# Patient Record
Sex: MALE | Race: OTHER | NOT HISPANIC OR LATINO | URBAN - METROPOLITAN AREA
[De-identification: names, ages, dates, MRNs, and addresses within clinical notes are randomized per-mention and may not be internally consistent; named-entity substitution may affect disease eponyms.]

---

## 2021-01-22 ENCOUNTER — EMERGENCY (EMERGENCY)
Facility: HOSPITAL | Age: 51
LOS: 1 days | Discharge: HOME | End: 2021-01-22
Attending: EMERGENCY MEDICINE | Admitting: EMERGENCY MEDICINE
Payer: OTHER GOVERNMENT

## 2021-01-22 VITALS
SYSTOLIC BLOOD PRESSURE: 148 MMHG | HEART RATE: 91 BPM | TEMPERATURE: 98 F | RESPIRATION RATE: 16 BRPM | OXYGEN SATURATION: 96 % | DIASTOLIC BLOOD PRESSURE: 85 MMHG

## 2021-01-22 VITALS
SYSTOLIC BLOOD PRESSURE: 153 MMHG | DIASTOLIC BLOOD PRESSURE: 84 MMHG | OXYGEN SATURATION: 94 % | RESPIRATION RATE: 16 BRPM | TEMPERATURE: 98 F | HEART RATE: 94 BPM

## 2021-01-22 DIAGNOSIS — S01.01XA LACERATION WITHOUT FOREIGN BODY OF SCALP, INITIAL ENCOUNTER: ICD-10-CM
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
## 2021-01-22 DIAGNOSIS — S01.81XA LACERATION WITHOUT FOREIGN BODY OF OTHER PART OF HEAD, INITIAL ENCOUNTER: ICD-10-CM
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
## 2021-01-22 PROCEDURE — 12002 RPR S/N/AX/GEN/TRNK2.6-7.5CM: CPT

## 2021-01-22 PROCEDURE — 70450 CT HEAD/BRAIN W/O DYE: CPT | Mod: 26

## 2021-01-22 PROCEDURE — 99284 EMERGENCY DEPT VISIT MOD MDM: CPT | Mod: 25

## 2021-01-22 RX ORDER — IBUPROFEN 200 MG
600 TABLET ORAL ONCE
Refills: 0 | Status: COMPLETED | OUTPATIENT
Start: 2021-01-22 | End: 2021-01-22

## 2021-01-22 RX ORDER — ACETAMINOPHEN 500 MG
975 TABLET ORAL ONCE
Refills: 0 | Status: COMPLETED | OUTPATIENT
Start: 2021-01-22 | End: 2021-01-22

## 2021-01-22 RX ADMIN — Medication 600 MILLIGRAM(S): at 22:42

## 2021-01-22 RX ADMIN — Medication 975 MILLIGRAM(S): at 22:42
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
## 2021-01-22 NOTE — ED PROVIDER NOTE - MUSCULOSKELETAL, MLM
Spine appears normal and is non tender, range of motion is not limited, no muscle or joint tenderness
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
## 2021-01-22 NOTE — ED PROVIDER NOTE - NSFOLLOWUPINSTRUCTIONS_ED_ALL_ED_FT
Laceração    Mantenha seco e coberto por 24 horas  Aplique bacitracina 2 a 3 vezes por ean jerome os próximos cheema.  Remoção da sutura em 10 cheema. Você pode ir a qualquer pronto-johnnie ou voltar aqui.      PROCURE CUIDADOS MÉDICOS IMEDIATOS SE VOCÊ TIVER ALGUM DOS SEGUINTES SINTOMAS: inchaço ao redor da ferida, piora da patricia, drenagem da ferida, estrias vermelhas saindo de sua ferida, incapacidade de  o dedo da mão ou da mão perto da laceração ou descoloração da pele perto laceração.      Síndrome Pós-Concussão    A síndrome pós-concussão ocorre quando os sintomas duram mais tempo do que o normal após um traumatismo craniano.  Quais são os sinais ou sintomas?  Após um ferimento na cabeça, você pode:  Tenho dores de cabeça. Sentir-se cansado. Sentir-se tonto. Sinta-se fraco. Tenho dificuldade em jeramie. Problemas com luzes yasmine. Problemas para ouvir. Não ser capaz de lembrar schuler coisas. Não consigo focar. Tem dificuldade para dormir. Têm mudanças de humor. Tem dificuldade em aprender coisas elton. Isso pode durar de semanas a meses.    Siga estas instruções em casa:  Medicamentos: Zofran 4mg PO conforme necessário para náuseas e vômitos.    [place] todos os medicamentos apenas conforme indicado pelo seu médico.  Não tome medicamentos prescritos para a patricia.    Atividade    Limite as atividades conforme indicado pelo seu médico. Isso inclui:  [place] de casa. Trabalho relacionado ao trabalho. Pensando. Assistindo TV. Usando um computador ou telefone. Exercício de quebra-cabeças. Esportes.  Retorne lentamente à sua atividade normal, conforme indicado pelo seu médico. Pare micheline atividade se tiver sintomas. Não faça nada que possa causar ferimentos novamente.    Instruções gerais    Descansar. Tente:  Durma de 7 a 9 horas por noite. Tire cochilos ou pausas quando se sentir cansado jerome o ean. Não natacha álcool até que seu médico diga que você pode. Acompanhe seus sintomas. [place]ha todas as consultas de acompanhamento conforme indicado pelo seu médico. Isso é importante.    Contate um médico se:  Você não melhora. Você fica pior. Você tem outra lesão.  Obtenha ajuda imediatamente se:  Você está com micheline patricia de cabeça muito forte. Você se sente confuso. Você se sente com muito sono. Você desmaia (desmaia). Você vomita (vomita). Você se sente fraco em qualquer parte do corpo. Você se sente entorpecido em qualquer parte do corpo. Você começa a tremer (tem micheline convulsão). Você tem dificuldade para falar.    Resumo  A síndrome pós-concussão ocorre quando os sintomas duram mais tempo do que o normal após um traumatismo craniano. Limite todas as atividades após a lesão. Retorne gradualmente à atividade normal conforme informado pelo seu médico. Descanse, não natacha álcool e evite analgésicos prescritos após micheline concussão. Chame seu médico se seus sintomas piorarem.    Estas informações não têm milagro objetivo substituir os conselhos dados a você por seu médico. Certifique-se de discutir quaisquer dúvidas que você tenha com o seu médico.
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
## 2021-01-22 NOTE — ED PROVIDER NOTE - PATIENT PORTAL LINK FT
You can access the FollowMyHealth Patient Portal offered by [facility] by registering at the following website: [email or web address]. By joining [employer]’s FollowMyHealth portal, you will also be able to view your health information using other applications (apps) compatible with our system.
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
## 2021-01-22 NOTE — ED PROVIDER NOTE - CLINICAL SUMMARY MEDICAL DECISION MAKING FREE TEXT BOX
Patient presenting with large and quite deep scalp laceration- will check CT head to exclude underlying fx or SAH. Td UTD. Will close lac and remove sutures from [facility]. Patient presenting with large and quite deep scalp laceration- will check CT head to exclude underlying fx or SAH. Td UTD. Will close lac and remove sutures from [facility]. [employer] [place] at bedside.

## 2021-02-04 ENCOUNTER — EMERGENCY (EMERGENCY)
Facility: HOSPITAL | Age: 51
LOS: 1 days | Discharge: HOME | End: 2021-02-04
Admitting: EMERGENCY MEDICINE
Payer: SELF-PAY

## 2021-02-04 VITALS
OXYGEN SATURATION: 95 % | DIASTOLIC BLOOD PRESSURE: 77 MMHG | HEART RATE: 87 BPM | TEMPERATURE: 98 F | RESPIRATION RATE: 18 BRPM | SYSTOLIC BLOOD PRESSURE: 111 MMHG
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
## 2021-02-04 DIAGNOSIS — Z48.02 ENCOUNTER FOR REMOVAL OF SUTURES: ICD-10-CM
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
## 2021-02-04 DIAGNOSIS — S01.01XD LACERATION WITHOUT FOREIGN BODY OF SCALP, SUBSEQUENT ENCOUNTER: ICD-10-CM
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
## 2021-02-04 PROCEDURE — L9994: CPT

## 2021-02-04 NOTE — ED PROVIDER NOTE - PATIENT PORTAL LINK FT
You can access the FollowMyHealth Patient Portal offered by [facility] by registering at the following website: [email or web address]. By joining [employer]’s FollowMyHealth portal, you will also be able to view your health information using other applications (apps) compatible with our system.

## 2021-02-04 NOTE — ED PROVIDER NOTE - PHYSICAL EXAMINATION
CONSTITUTIONAL: Well-appearing; well-nourished; in no apparent distress.   	HEAD: staples to left scalp clean/dry/intact, no discharge/pus. no erythema/swelling.   	NEURO: A & O x 3; face symmetric; grossly unremarkable.   PSYCHOLOGICAL: The patient’s mood and manner are appropriate.
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
## 2021-02-04 NOTE — ED ADULT TRIAGE NOTE - CHIEF COMPLAINT QUOTE
Pt presents to ED c/o x10 staples removal to the left side of head that was placed x12 days ago. Site clean and intact. Denies any pain

## 2021-02-05 PROBLEM — Z78.9 OTHER SPECIFIED HEALTH STATUS: Chronic | Status: ACTIVE | Noted: 2021-01-22

## 2021-02-16 ENCOUNTER — EMERGENCY (EMERGENCY)
Facility: HOSPITAL | Age: 51
LOS: 1 days | Discharge: HOME | End: 2021-02-16
Attending: EMERGENCY MEDICINE | Admitting: EMERGENCY MEDICINE
Payer: SELF-PAY

## 2021-02-16 VITALS
HEIGHT: 74.41 IN | WEIGHT: 231.49 LBS | DIASTOLIC BLOOD PRESSURE: 90 MMHG | HEART RATE: 87 BPM | SYSTOLIC BLOOD PRESSURE: 140 MMHG | OXYGEN SATURATION: 96 % | TEMPERATURE: 99 F | RESPIRATION RATE: 18 BRPM

## 2021-02-16 PROCEDURE — 70450 CT HEAD/BRAIN W/O DYE: CPT | Mod: 26

## 2021-02-16 PROCEDURE — 99284 EMERGENCY DEPT VISIT MOD MDM: CPT

## 2021-02-16 RX ORDER — ONDANSETRON 8 MG/1
4 TABLET, FILM COATED ORAL ONCE
Refills: 0 | Status: COMPLETED | OUTPATIENT
Start: 2021-02-16 | End: 2021-02-16

## 2021-02-16 RX ORDER — IBUPROFEN 200 MG
800 TABLET ORAL ONCE
Refills: 0 | Status: COMPLETED | OUTPATIENT
Start: 2021-02-16 | End: 2021-02-16

## 2021-02-16 RX ORDER — ACETAMINOPHEN 500 MG
975 TABLET ORAL ONCE
Refills: 0 | Status: COMPLETED | OUTPATIENT
Start: 2021-02-16 | End: 2021-02-16

## 2021-02-16 RX ADMIN — ONDANSETRON 4 MILLIGRAM(S): 8 TABLET, FILM COATED ORAL at 15:52

## 2021-02-16 RX ADMIN — Medication 975 MILLIGRAM(S): at 15:52

## 2021-02-16 RX ADMIN — Medication 800 MILLIGRAM(S): at 15:52
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
## 2021-02-16 NOTE — ED PROVIDER NOTE - CARE PROVIDER_API CALL
Gavin Edmondson)  Neurology; Neuromuscular Medicine  [address], [address] [place] [address]  Phone: [phone]  Fax: [phone]  Follow Up Time:

## 2021-02-16 NOTE — ED PROVIDER NOTE - PATIENT PORTAL LINK FT
You can access the FollowMyHealth Patient Portal offered by [facility] by registering at the following website: [email or web address]. By joining [employer]’s FollowMyHealth portal, you will also be able to view your health information using other applications (apps) compatible with our system.

## 2021-02-16 NOTE — ED PROVIDER NOTE - PHYSICAL EXAMINATION
VITAL SIGNS: I have reviewed nursing notes and confirm.  CONSTITUTIONAL: Well-developed; well-nourished; in no acute distress.  SKIN: Skin exam is warm and dry, no acute rash.  HEAD: Normocephalic; atraumatic.  EYES: PERRL, EOM intact; conjunctiva and sclera clear.  ENT: No nasal discharge; airway clear.  NECK: Supple; non tender.  CARD: S1, S2 normal; no murmurs, gallops, or rubs. Regular rate and rhythm.  RESP: Unlabored. No wheezes, rales or rhonchi.  ABD: soft; non-distended; non-tender  MSK: Normal ROM. No cyanosis or edema. Non-ttp all ext, distal pulses intact  LYMPH: No acute cervical adenopathy.  NEURO: Patient is alert, oriented x person, place and time.  Cranial nerves 2-12 are intact.  Normal gait and speech.  Cerebellar testing normal:  negative Romberg, normal coordination and normal finger to nose, heal to shin and rapid alternating movements.  Normal sensory exam throughout.  No pronator drift.  5/5 bl upper extremity and lower extremity strength. Visual fields intact   PSYCH: Cooperative, appropriate.
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
## 2021-02-16 NOTE — ED PROVIDER NOTE - NSFOLLOWUPINSTRUCTIONS_ED_ALL_ED_FT
Concussão, adultos    Concussion, Adult       Micheline concussão é micheline lesão no cérebro decorrente de um impacto forte e direto (trauma) na cabeça ou no corpo. Esse impacto direto tanisha o cérebro chacoalhar rapidamente para frente e para trás dentro do crânio. Isso pode danificar células cerebrais e causar alterações químicas no cérebro. Concussões também podem ser conhecidas gary lesões cerebrais traumáticas (LCTs) leves.    Concussões em nisa não representam risco de britta, mas os efeitos de micheline concussão podem ser sérios. Se você sofrer micheline concussão, deve ter muito cuidado para evitar micheline segunda concussão.      Quais são as causas?  Esse quadro clínico é causado por:•Micheline pancada diretamente na cabeça, gary:  •Chocar-se contra outro jogador jerome micheline armando de esporte.      •Alexi um golpe em micheline luta.      •Bater a cabeça sobre micheline superfície dura.        •Um movimento súbito de seu corpo que tanisha com que seu cérebro se mova para frente e para trás dentro do crânio, gary em um acidente de erika.        Quais são os sinais ou sintomas?    Os sinais de micheline concussão podem ser difíceis de perceber. Logo de início, você, parentes seus e profissionais de saúde poderão não notá-los. Você pode aparentar estar bem externamente, mas pode se comportar ou se sentir diferente.    Os sintomas geralmente são temporários e, na maioria dos casos, melhoram em 7–10 cheema. Alguns sintomas aparecem imediatamente, mas outros podem surgir apenas depois de horas ou cheema. Se os sintomas durarem mais que o normal, você pode estar tendo micheline síndrome pós-concussão. Cada lesão na cabeça é diferente.    Sintomas físicos     •Dores de cabeça. Isso pode incluir micheline sensação de pressão na cabeça ou sintomas semelhantes aos de micheline enxaqueca.      •Cansaço (fadiga).      •Tontura.      •Problemas com a coordenação e equilíbrio.      •Problemas de visão ou audição.      •Sensibilidade à khai ou ao ruído.      •Enjoo ou vômito.      •Alterações dos hábitos alimentares ou de sono.      •Dormência ou formigamento.      •Convulsão.      Sintomas mentais e emocionais     •Problemas de memória.      •Problemas para se concentrar, organizar ou leah decisões.      •Lentidão ao pensar, agir ou reagir, falar ou ler.      •Irritabilidade ou alterações do humor.      •Ansiedade ou depressão.        [place] esse quadro clínico é diagnosticado?  Esse quadro clínico é diagnosticado com base em:  •Seus sintomas.      •Micheline descrição da lesão.    Você também poderá realizar exames, inclusive:  •Exames por imagem, gary um exame de TC (tomografia computadorizada) ou RM (ressonância magnética).      •Testes neuropsicológicos. Esses testes medem o pensamento, a compreensão, o aprendizado e a memória.        [place] esse quadro clínico é tratado?  O tratamento desse quadro clínico inclui:•Parar de praticar esportes ou outras atividades se você estiver machucado. Se você bater a cabeça ou mostrar sinais de concussão:   •Não volte a praticar esportes ou atividade física no mesmo ean.       •Seja examinado por um médico antes de retornar às suas atividades.        •Repouso físico e mental e observação atenta, em nisa em casa. Descanse e retorne, gradualmente, às atividades normais.    •Medicamentos para ajudar com sintomas gary patricia de cabeça, enjoo ou dificuldade para dormir.  •Evite leah remédios opioides para patricia na recuperação de micheline concussão.        •Evite álcool e drogas. Eles podem retardar sua recuperação e colocar você sob risco de elton lesões adicionais.      •Encaminhamento para micheline clínica de tratamento de concussões ou centro de reabilitação.    A recuperação de micheline concussão pode alexi tempo. A rapidez com que você vai se recuperar depende de diversos fatores. Retorne às atividades apenas quando:  •Seus sintomas desaparecerem completamente.      •Seu médico liberar.        Siga essas instruções em casa:    Atividades   •Limite atividades que exijam muito pensamento ou concentração, gary:  •Trabalho de casa e tarefas relacionadas ao trabalho.      •Assistir a TV.      •Trabalhar no computador ou no telefone.      •Jogar jogos de memorização e quebra-cabeças.      •Repouso. Lynco ajuda seu cérebro a se recuperar. Certifique-se de que você:  •Dormir bastante. A maioria dos adultos precisa de 7–9 horas de sono por noite.      •Descansar jerome o ean. Tirar sonecas ou fazer pausas para descansar quando se sentir cansado.        •Evite atividades gary exercícios físicos até seu médico liberar. Interrompa qualquer atividade que piore os sintomas.      • Não pratique atividades de alto risco que possam causar micheline segunda concussão, gary andar de bicicleta ou praticar esportes.      •Pergunte ao seu médico quando você poderá retomar suas atividades normais, gary estudos, trabalho, atividade física e dirigir. Sua capacidade de reação poderá ficar mais lenta após micheline lesão cerebral. Não realize essas atividades se estiver sentindo tontura. Seu médico provavelmente vai oferecer a você um plano para o retorno gradual às atividades regulares.        Instruções gerais      •[place] medicamentos vendidos com ou salima receita médica somente de acordo com as indicações do seu médico. Alguns medicamentos, gary remédio para afinar o sangue (anticoagulantes) e aspirina, podem aumentar o risco de complicações, gary hemorragia.      • Não natacha álcool a menos que seu médico aprove isso.      •Observe seus sintomas e peça às pessoas que convivem com você que façam o mesmo. Às vezes, concussões têm complicações. Adultos mais idosos com lesões cerebrais podem apresentar maior risco de complicações sérias.      •Informe seu chefe no trabalho, seus professores, a enfermeira e o coordenador da escola ou preparador físico sobre sua lesão, seus sintomas e restrições.      •Compareça a todas as consultas de acompanhamento de acordo com as orientações do seu médico. Isso é importante.        [place] esse quadro clínico pode ser prevenido?  Evitar elton lesões cerebrais é muito importante. Em casos raros, micheline nova lesão pode causar edin cerebral permanente, inchaço no cérebro ou morte. O risco disso é maior jerome os primeiros 7–10 cheema após micheline lesão na cabeça. Evite lesões seguindo as recomendações a seguir:  •Não pratique atividades que possam causar micheline segunda concussão, gary esportes de contato ou recreativos, até seu médico liberar.    •[place] as seguintes medidas após ter retomado esportes ou atividade física:  •Evite jogadas ou movimentos que possam causar choque com outra charles. É assim que a maioria schuler concussões ocorre.      •Siga as regras e respeite os outros jogadores. Não participe de jogadas violentas ou irregulares.        •Faça exercícios regularmente que incluam treinamento de força e equilíbrio.      •Use um capacete adequado jerome a prática de esportes, ciclismo ou outras atividades. Os capacetes podem ajudar a protegê-lo de lesões graves no crânio e no cérebro, mas não protegem contra concussão. Mesmo usando um capacete, você deve evitar ser atingido na cabeça.        Entre em contato com um médico se:    •Seus sintomas piorarem ou não melhorarem.      •Surgirem novos sintomas.      •Você sofrer outra lesão.        Obtenha ajuda imediatamente se:    •Tiver dores de cabeça severas ou crescentes.      •Apresentar fraqueza ou dormência em qualquer parte do corpo.      •Sentir-se confuso.      •Sua coordenação piorar.      •Vomitar repetidamente.      •Sentir mais sono do que o normal.      •Sua fala ficar arrastada.      •Não conseguir reconhecer pessoas ou lugares.      •Tiver convulsões.      •Estiver difícil acordar.      •Apresentar alterações de comportamento incomuns.      •Apresentar alterações da visão.      •Perder a consciência.        Resumo    •Micheline concussão é micheline lesão no cérebro decorrente de um impacto forte e direto (trauma) em sua cabeça ou corpo.      •Você poderá fazer exames por imagens e testes neuropsicológicos para diagnosticar micheline concussão.      •O tratamento desse quadro clínico consiste em repouso físico e mental e observação atenta.      •Pergunte ao seu médico quando você poderá retomar suas atividades normais, gary estudos, trabalho, atividade física e dirigir.       •Procure atendimento imediatamente se você tiver patricia de cabeça intensa, fraqueza em um lado do corpo, convulsões, alterações de comportamento, alterações na visão ou se estiver confuso ou mais sonolento do que o normal.

## 2021-02-17 PROBLEM — Z00.00 ENCOUNTER FOR PREVENTIVE HEALTH EXAMINATION: Status: ACTIVE | Noted: 2021-02-17

## 2021-02-20 DIAGNOSIS — F07.81 POSTCONCUSSIONAL SYNDROME: ICD-10-CM

## 2021-02-20 DIAGNOSIS — R51.9 HEADACHE, UNSPECIFIED: ICD-10-CM

## 2024-07-02 NOTE — ED ADULT NURSE NOTE - NSFALLRSKASSESSDT_ED_ALL_ED
HCC coding opportunities       Chart reviewed, no opportunity found: CHART REVIEWED, NO OPPORTUNITY FOUND   GR     Patients Insurance     Medicare Insurance: [employer] Advantage          
16-Feb-2021 17:46